# Patient Record
(demographics unavailable — no encounter records)

---

## 2024-11-22 NOTE — HISTORY OF PRESENT ILLNESS
[FreeTextEntry1] : Pt is a 67 y/o F who presents today for f/u.  She has PMH HTN dx about 2010, SEBASTIÁN, HLD.  Family hx: mother lung cancer, father CAD/PCI 75, siblings HTN  She is scheduled for abdominoplasty 12/04/2024 with Dr Martinez  Pt reports feeling well and has no active cardiac complaints - denies CP, SOB, palpitations, dizziness, syncope, edema, orthopnea, PND, orthopnea.  No exertional symptoms. No new hospitalizations, emergency room/urgent care visits, new medical diagnoses, new medications, surgery, or cardiac testing since last OV.   TTE 07/2020 normal LV function without significant valvular disease  PCP Dr Irene Uribe   PMH: HTN, SEBASTIÁN uses mouth guard, anxiety/depression Smoking status: never social ETOH no drug use Current exercise: walking daily, pilates, strength Daily water intake: 24 oz Daily caffeine intake: 2 cups coffee OTC medications: none Family hx: mother lung cancer, father CAD/PCI 75, siblings HTN Previous hospitalizations:  L foot surgery x2 - no problems with anesthesia 2 children - no problem with pregnancies

## 2024-11-22 NOTE — DISCUSSION/SUMMARY
[EKG obtained to assist in diagnosis and management of assessed problem(s)] : EKG obtained to assist in diagnosis and management of assessed problem(s) [FreeTextEntry1] : Pt is a 65 y/o F with HTN, HLD, SEBASTIÁN She is scheduled for abdominoplasty 12/04/2024 with Dr Martinez  repeat TTE check nuc stress test to eval for ischemia Further preop recommendations will be made once diagnostic testing is complete.   HTN: well controlled c/w metoprolol succinate 50mg qd and norvasc 5mg qd Advised low salt diet, regular exercise TTE 07/2020 normal LV function without significant valvular disease  HLD: c/w statin Advised lifestyle modifications   The described plan was discussed with the pt.  All questions and concerns were addressed to the best of my knowledge.